# Patient Record
Sex: FEMALE | Race: WHITE | NOT HISPANIC OR LATINO | Employment: OTHER | URBAN - METROPOLITAN AREA
[De-identification: names, ages, dates, MRNs, and addresses within clinical notes are randomized per-mention and may not be internally consistent; named-entity substitution may affect disease eponyms.]

---

## 2017-01-23 ENCOUNTER — OFFICE VISIT (OUTPATIENT)
Dept: GYNECOLOGIC ONCOLOGY | Facility: CLINIC | Age: 47
End: 2017-01-23

## 2017-01-23 VITALS
RESPIRATION RATE: 16 BRPM | WEIGHT: 182 LBS | TEMPERATURE: 97.3 F | OXYGEN SATURATION: 96 % | SYSTOLIC BLOOD PRESSURE: 116 MMHG | BODY MASS INDEX: 28.08 KG/M2 | DIASTOLIC BLOOD PRESSURE: 63 MMHG | HEART RATE: 90 BPM

## 2017-01-23 DIAGNOSIS — Z01.419 WELL WOMAN EXAM WITH ROUTINE GYNECOLOGICAL EXAM: ICD-10-CM

## 2017-01-23 DIAGNOSIS — C53.8 CANCER OF OVERLAPPING SITES OF CERVIX UTERI (HCC): Primary | ICD-10-CM

## 2017-01-23 DIAGNOSIS — R91.1 LUNG NODULE, SOLITARY: ICD-10-CM

## 2017-01-23 PROCEDURE — 99396 PREV VISIT EST AGE 40-64: CPT | Performed by: NURSE PRACTITIONER

## 2017-01-23 RX ORDER — TRIAMCINOLONE ACETONIDE 55 UG/1
2 SPRAY, METERED NASAL 2 TIMES DAILY
COMMUNITY
End: 2021-06-22

## 2017-01-23 NOTE — PROGRESS NOTES
GYN ONCOLOGY ANNUAL WELL WOMAN VISIT      Jackelyn Galindo  [id number]  1970      Chief Complaint: Follow-up (3 mo with no complaints)        History of present illness:  Jackelyn Galindo is a 46 y.o. year old female who is here today for an annual exam and ongoing surveillance of Cervical Cancer, see cancer history below. She reports she is feeling very well today and has no complaints. She denies pelvic pain, changes in bowel or bladder function, new or concerning lesions, and breast problems. She continues to have some radiation related post-coital vaginal spotting, but denies any spontaneous vaginal bleeding. Her repeat chest CT on 11/9/2016 showed a stable singular lung nodule and plan is to repeat study in 6 months.  She is now 2 years out from completion of primary treatment and very pleased with her outcomes. She underwent tonsillectomy in 11/2016 and is well recovered. Her mammogram is currently UTD and she plans to repeat colonoscopy at age 48. She has a trip to [facility] planned for early 4/2017 which she is really looking forward to.        Cancer History:      Cancer of overlapping sites of cervix uteri    11/3/2014 Biopsy    Cervical biopsy and EMB for pap smear showing high-grade lesion with 6 months of AUB including post-coital bleeding. Endometrium was benign, however, cervical biopsy showed marked atypia consistent with at least CIS and suspicious for invasion.       11/6/2014 -  Other Event    6 cm verrucoid lesion identified upon exam and consistent with invasive cancer. Tumor replaced most of the cervix and did not involve the vagina.       11/10/2014 Imaging    PET scan consistent with primary malignancy at the cervix. Also reveals fluid in the endometrial cavity and low level activity at the right adnexa      11/19/2014 - 12/22/2014 Chemotherapy    Cisplatin 40 mg/m2 weekly x 6 cycles +RT      11/20/2014 - 12/31/2014 Radiation    Pelvis received 45 Gy in 25 fractions with 6 MV photons  "utilizing IMRT treatment planning.      12/15/2014 Surgery    Placement of cervical sleeve with tandem and ovoid placement per Dr. Ramsay      12/15/2014 - 2015 Radiation    Brachytherapy implants x 5 with 5.5 Gy delivered to point \"A\" on each implant        2016 Imaging    Repeat PET shows resolution of activity at the right adnexa and cervical regions. New activity identified within a posterior nodule in the right upper lung, concerning for malignant focus, but needing clinical correlation      3/11/2016 -  Other Event    Navigational brochoscopy for evaluation of RUL lesion, all cytology negative      2016 Imaging    Repeat chest CT shows stable 1 cm soft tissue nodule         Obstetric History:  OB History      Para Term  AB TAB SAB Ectopic Multiple Living    3 3 3                Menstrual History:     No LMP recorded. Patient is not currently having periods (Reason: Chemotherapy/radiation).          Past Medical History   Diagnosis Date   • Cervical cancer    • Drug therapy 10/2014     FOR CERVICAL CA   • Hx of radiation therapy 10/2014     FOR CERVICAL CA   • Lung nodule, solitary      1 cm in RUL, negative cytology upon navigational brochoscopy   • Malignant tumor of cervix        Past Surgical History   Procedure Laterality Date   • Tandem and ovoid insertion       with cervical sleeve, sleeve then removed    • Colonoscopy     • Tonsillectomy  2016       MEDICATIONS: The current medication list was reviewed and reconciled.     Allergies:  has No Known Allergies.    Family History   Problem Relation Age of Onset   • Colon cancer Mother    • Ovarian cancer Mother 35   • Breast cancer Maternal Aunt    • Coronary artery disease Father    • Breast cancer Other    • Breast cancer Paternal Aunt 50       Health Maintenance:  Last mammogram was 2016. Last colonoscopy was , with recommended follow-up at age 48. Last pap smear was 10/24/2016, results were  normal PAP..    Last " imaging study was chest CT 11/2016, stable.     Review of Systems   Constitutional: Negative for fatigue, fever and unexpected weight change.   HENT: Negative for congestion, ear pain, hearing loss, sinus pressure and trouble swallowing.    Eyes: Negative for visual disturbance.   Respiratory: Negative for cough, chest tightness, shortness of breath and wheezing.    Cardiovascular: Negative for chest pain, palpitations and leg swelling.   Gastrointestinal: Negative for abdominal distention, abdominal pain, constipation, diarrhea, nausea and vomiting.   Endocrine: Negative for cold intolerance, heat intolerance, polydipsia, polyphagia and polyuria.   Genitourinary: Negative for difficulty urinating, dyspareunia, dysuria, frequency, hematuria, pelvic pain, urgency, vaginal bleeding, vaginal discharge and vaginal pain.   Musculoskeletal: Negative for arthralgias, gait problem, joint swelling and myalgias.   Skin: Negative for color change, pallor and rash.   Neurological: Negative for dizziness, seizures, syncope, weakness, light-headedness, numbness and headaches.   Hematological: Negative for adenopathy. Does not bruise/bleed easily.   Psychiatric/Behavioral: Negative for agitation, confusion, sleep disturbance and suicidal ideas. The patient is not nervous/anxious.        Physical Exam  General Appearance:  alert, cooperative, no apparent distress, appears stated age and normal weight   Neurologic/Psychiatric: A&O x 3, gait steady, appropriate affect   HEENT:  Normocephalic, without obvious abnormality, mucous membranes moist   Neck: Supple, symmetrical, trachea midline, no adenopathy;  No thyromegaly, masses, or tenderness   Back:   Symmetric, no curvature, ROM normal, no CVA tenderness   Lungs:   Clear to auscultation bilaterally; respirations regular, even, and unlabored bilaterally   Heart:  Regular rate and rhythm, no murmurs appreciated   Breasts:  Symmetrical, no masses, no lesions and no nipple discharge    Abdomen:   Soft, non-tender, non-distended and no organomegaly   Lymph nodes: No cervical, supraclavicular, inguinal or axillary adenopathy noted   Extremities: Normal, atraumatic; no clubbing, cyanosis, or edema    Skin: No rashes, ulcers, or suspicious lesions noted   Pelvic: External Genitalia  without lesions or skin changes  Vagina  is pink, moist, without lesions.  and changes consistent with radiation  Cervix  normal, without lesions, no discharge, no CMT and decreased cervical mobility and stenosis consistent with prior radiation. Pap smear obtained and vaginal cuff bleeding upon cytobrush  Uterus  normal size, midposition, mobile and nontender  Ovaries  without palpable masses or fullness  Parametria  smooth  Rectovaginal  Female rectovaginal: deferred     ECOG Performance Status: 0 - Asymptomatic    Procedure Note:  No notes on file    Assessment and Plan:    Jackelyn was seen today for follow-up.    Diagnoses and all orders for this visit:    Cancer of overlapping sites of cervix uteri  -     Liquid-based Pap Smear, Screening; Future    Well woman exam with routine gynecological exam    Lung nodule, solitary        There is no evidence of disease upon today's exam. She may call in 1 week for pap smear results. I am pleased to see that her recent chest CT was stable. She is doing very well from a GYN perspective and may now go to cancer surveillance visits every 6 months. Every other visit will be a complete annual exam going forward as she requests to keep all her GYN care through our office. She is encouraged to call with any questions, changes in pelvic symptoms, or concerns if needed between visits.     Return in about 6 months (around 7/23/2017) for ongoing cancer surveillance.      TEETEE Fan      Note: Speech recognition transcription software was used to dictate portions of this document.  An attempt at proofreading has been made though minor errors in transcription may still be  present.  Please do not hesitate to call our office with any questions.

## 2017-01-23 NOTE — PROGRESS NOTES
"GYN ONCOLOGY CANCER SURVEILLANCE FOLLOW-UP    Jackelyn Galindo  [id number]  1970    Chief Complaint: Follow-up (3 mo with no complaints)        History of present illness:  Jackelyn Galindo is a 46 y.o. year old female who is here today for ongoing surveillance of Cervical Cancer, see Cancer History. ***        Cancer History:      Cancer of overlapping sites of cervix uteri    11/3/2014 Biopsy    Cervical biopsy and EMB for pap smear showing high-grade lesion with 6 months of AUB including post-coital bleeding. Endometrium was benign, however, cervical biopsy showed marked atypia consistent with at least CIS and suspicious for invasion.       11/6/2014 -  Other Event    6 cm verrucoid lesion identified upon exam and consistent with invasive cancer. Tumor replaced most of the cervix and did not involve the vagina.       11/10/2014 Imaging    PET scan consistent with primary malignancy at the cervix. Also reveals fluid in the endometrial cavity and low level activity at the right adnexa      11/19/2014 - 12/22/2014 Chemotherapy    Cisplatin 40 mg/m2 weekly x 6 cycles +RT      11/20/2014 - 12/31/2014 Radiation    Pelvis received 45 Gy in 25 fractions with 6 MV photons utilizing IMRT treatment planning.      12/15/2014 Surgery    Placement of cervical sleeve with tandem and ovoid placement per Dr. Ramsay      12/15/2014 - 1/8/2015 Radiation    Brachytherapy implants x 5 with 5.5 Gy delivered to point \"A\" on each implant        2/16/2016 Imaging    Repeat PET shows resolution of activity at the right adnexa and cervical regions. New activity identified within a posterior nodule in the right upper lung, concerning for malignant focus, but needing clinical correlation      3/11/2016 -  Other Event    Navigational brochoscopy for evaluation of RUL lesion, all cytology negative      5/13/2016 Imaging    Repeat chest CT shows stable 1 cm soft tissue nodule         Past Medical History   Diagnosis Date   • Cervical cancer  " "  • Drug therapy 10/2014     FOR CERVICAL CA   • Hx of radiation therapy 10/2014     FOR CERVICAL CA   • Lung nodule, solitary      1 cm in RUL, negative cytology upon navigational brochoscopy   • Malignant tumor of cervix        Past Surgical History   Procedure Laterality Date   • Tandem and ovoid insertion       with cervical sleeve, sleeve then removed    • Colonoscopy     • Tonsillectomy  11/2016       MEDICATIONS: The current medication list was reviewed and reconciled.     Allergies:  has No Known Allergies.    Family History   Problem Relation Age of Onset   • Colon cancer Mother    • Ovarian cancer Mother 35   • Breast cancer Maternal Aunt    • Coronary artery disease Father    • Breast cancer Other    • Breast cancer Paternal Aunt 50       Last imaging study was ***.   Tumor marker:  No results found for:     Review of Systems    Physical Exam  General Appearance:  {[facility] General Appearance:83774::\"alert, cooperative, no apparent distress\",\"appears stated age\"}   Neurologic/Psychiatric: A&O x 3, gait steady, appropriate affect   HEENT:  Normocephalic, without obvious abnormality, mucous membranes moist   Neck: Supple, symmetrical, trachea midline, no adenopathy;  No thyromegaly, masses, or tenderness   Back:   Symmetric, no curvature, ROM normal, no CVA tenderness   Lungs:   Clear to auscultation bilaterally; respirations regular, even, and unlabored bilaterally   Heart:  Regular rate and rhythm, no murmurs appreciated   Breasts:  {Gyn Onc Breast Exam:08915::\"deferred\"}   Abdomen:   {[facility] Abdomen Exam:96860::\"Soft\",\"non-tender\",\"non-distended\",\"no organomegaly\"}   Lymph nodes: No cervical, supraclavicular, inguinal or axillary adenopathy noted   Extremities: Normal, atraumatic; no clubbing, cyanosis, or edema    Pelvic: {GynOnc Pelvic Exam:16085}     ECOG Performance Status: {performance status:50766}    Procedure Note:  No notes on file      Assessment and Plan:  Jackelyn was seen today for " follow-up.    Diagnoses and all orders for this visit:    Cancer of overlapping sites of cervix uteri        ***    No Follow-up on file.      TEETEE Fan        Note: Speech recognition transcription software was used to dictate portions of this document.  An attempt at proofreading has been made though minor errors in transcription may still be present.  Please do not hesitate to call our office with any questions.

## 2017-01-23 NOTE — MR AVS SNAPSHOT
Jackelyn Galindo   1/23/2017 10:30 AM   Office Visit    Dept Phone:  [phone]   Encounter #:  [id number]    Provider:  TEETEE Fan   Department:  [facility] GYNECOLOGIC ONCOLOGY                Your Full Care Plan              Your Updated Medication List          This list is accurate as of: 1/23/17 11:27 AM.  Always use your most recent med list.                Triamcinolone Acetonide 55 MCG/ACT nasal inhaler   Commonly known as:  NASACORT               You Were Diagnosed With        Codes Comments    Cancer of overlapping sites of cervix uteri    -  Primary ICD-10-CM: C53.8  ICD-9-CM: 180.8     Well woman exam with routine gynecological exam     ICD-10-CM: Z01.419  ICD-9-CM: V72.31       Instructions     None    Patient Instructions History      Upcoming Appointments     Visit Type Date Time Department    FOLLOW UP 1/23/2017 10:30 AM [facility] ONC GYN [facility]    CT [facility] CHEST WO CONTRAST 5/10/2017 10:30 AM [facility] [place]    FOLLOW UP 7/24/2017 10:00 AM [facility] GYN [facility]    PHYSICAL 10/27/2017 10:00 AM [facility]      Zoodak Signup     Our records indicate that you have an active [facility] [employer] account.    You can view your After Visit Summary by going to [email or web address] and logging in with your Zoodak username and password.  If you don't have a [employer] username and password but a parent or guardian has access to your record, the parent or guardian should login with their own [employer]ak username and password and access your record to view the After Visit Summary.    If you have questions, you can email [email or web address] or call [phone] to talk to our [employer] staff.  Remember, Z[employer]ak is NOT to be used for urgent needs.  For medical emergencies, dial 911.               Other Info from Your Visit           Your Appointments     May 10, 2017 10:30 AM EDT   CT marybeth chest wo contrast with MARYBETH ANGELICA CT 1   [facility]  [address] [place] [address]    [address] [facility] [address] [phone]           n/a            Jul 24, 2017 10:00 AM EDT   Follow Up with TEETEE Fan   [facility] GYNECOLOGIC ONCOLOGY (--)    [address]  [facility] [address] [phone]           Arrive 15 minutes prior to appointment.            Oct 27, 2017 10:00 AM EDT   Physical with Patti Rodriguez DO   [facility] FAMILY MEDICINE (--)    [address]  [facility] [address] [phone]           Arrive 15 minutes prior to appointment. Arrive 15 minutes before your appointment to complete Registration. Please bring all medications, insurance card and copay.              Allergies     No Known Allergies      Reason for Visit     Follow-up 3 mo with no complaints      Vital Signs     Blood Pressure Pulse Temperature Respirations Weight Oxygen Saturation    116/63 90 97.3 °F (36.3 °C) (Temporal Artery ) 16 182 lb (82.6 kg) 96%    Body Mass Index Smoking Status                28.08 kg/m2 Never Smoker          Problems and Diagnoses Noted     Cancer of overlapping sites of cervix uteri    Well woman exam with routine gynecological exam

## 2017-05-10 ENCOUNTER — HOSPITAL ENCOUNTER (OUTPATIENT)
Dept: CT IMAGING | Facility: HOSPITAL | Age: 47
Discharge: HOME | End: 2017-05-10
Attending: INTERNAL MEDICINE

## 2017-05-10 ENCOUNTER — HOSPITAL ENCOUNTER (OUTPATIENT)
Dept: CT IMAGING | Facility: HOSPITAL | Age: 47
Discharge: HOME | End: 2017-05-10
Attending: INTERNAL MEDICINE | Admitting: INTERNAL MEDICINE

## 2017-05-10 DIAGNOSIS — R91.1 SOLITARY PULMONARY NODULE: ICD-10-CM

## 2017-05-10 PROCEDURE — 71250 CT THORAX DX C-: CPT

## 2017-07-24 ENCOUNTER — OFFICE VISIT (OUTPATIENT)
Dept: GYNECOLOGIC ONCOLOGY | Facility: CLINIC | Age: 47
End: 2017-07-24

## 2017-07-24 VITALS
OXYGEN SATURATION: 96 % | RESPIRATION RATE: 16 BRPM | DIASTOLIC BLOOD PRESSURE: 82 MMHG | TEMPERATURE: 97.5 F | HEART RATE: 85 BPM | WEIGHT: 179 LBS | BODY MASS INDEX: 27.62 KG/M2 | SYSTOLIC BLOOD PRESSURE: 135 MMHG

## 2017-07-24 DIAGNOSIS — R91.1 LUNG NODULE, SOLITARY: ICD-10-CM

## 2017-07-24 DIAGNOSIS — C53.8 CANCER OF OVERLAPPING SITES OF CERVIX UTERI (HCC): Primary | ICD-10-CM

## 2017-07-24 PROCEDURE — 99213 OFFICE O/P EST LOW 20 MIN: CPT | Performed by: NURSE PRACTITIONER

## 2017-07-24 NOTE — PROGRESS NOTES
GYN ONCOLOGY CANCER SURVEILLANCE FOLLOW-UP    Jackelyn Galindo  [id number]  1970    Chief Complaint: Follow-up (6 mo fup no problems) and Cervical Cancer (interested in ACP)        History of present illness:  Jackelyn Galindo is a 47 y.o. year old female who is here today for ongoing surveillance of Cervical Cancer, see Cancer History. She underwent repeat CT chest to follow solitary lung nodule on 5/10/2017. Nodule found to be stable with no acute chest pathology. She reports she is feeling very well today and has no complaints. She denies vaginal bleeding, pelvic pain, and changes in bowel or bladder function. She continues to have mild expected spotting post-coitus r/t expected radiation change.   Her son will be starting college at the [employer] this fall. They have decided to sell their home here, have purchased a smaller place, and will be moving to [place] for most of the year. They will continue to travel back and forth d/t her 's business here in [place]. They are very excited for this transition as they live a very active outdoor lifestyle. She states she will continue to be in [place] at least once/month and would like to continue her care through our office.         Cancer History:      Cancer of overlapping sites of cervix uteri    11/3/2014 Biopsy     Cervical biopsy and EMB for pap smear showing high-grade lesion with 6 months of AUB including post-coital bleeding. Endometrium was benign, however, cervical biopsy showed marked atypia consistent with at least CIS and suspicious for invasion.          11/6/2014 -  Other Event     6 cm verrucoid lesion identified upon exam and consistent with invasive cancer. Tumor replaced most of the cervix and did not involve the vagina.          11/10/2014 Imaging     PET scan consistent with primary malignancy at the cervix. Also reveals fluid in the endometrial cavity and low level activity at the right adnexa         11/19/2014 - 12/22/2014 Chemotherapy  "    Cisplatin 40 mg/m2 weekly x 6 cycles +RT         11/20/2014 - 12/31/2014 Radiation     Pelvis received 45 Gy in 25 fractions with 6 MV photons utilizing IMRT treatment planning.         12/15/2014 Surgery     Placement of cervical sleeve with tandem and ovoid placement per Dr. Ramsay         12/15/2014 - 1/8/2015 Radiation     Brachytherapy implants x 5 with 5.5 Gy delivered to point \"A\" on each implant           2/16/2016 Imaging     Repeat PET shows resolution of activity at the right adnexa and cervical regions. New activity identified within a posterior nodule in the right upper lung, concerning for malignant focus, but needing clinical correlation         3/11/2016 -  Other Event     Navigational brochoscopy for evaluation of RUL lesion, all cytology negative         5/13/2016 Imaging     Repeat chest CT shows stable 1 cm soft tissue nodule            Past Medical History:   Diagnosis Date   • Cervical cancer    • Drug therapy 10/2014    FOR CERVICAL CA   • Hx of radiation therapy 10/2014    FOR CERVICAL CA   • Lung nodule, solitary     1 cm in RUL, negative cytology upon navigational brochoscopy   • Malignant tumor of cervix        Past Surgical History:   Procedure Laterality Date   • COLONOSCOPY     • TANDEM AND OVOID INSERTION      with cervical sleeve, sleeve then removed    • TONSILLECTOMY  11/2016       MEDICATIONS: The current medication list was reviewed and reconciled.     Allergies:  has No Known Allergies.    Family History   Problem Relation Age of Onset   • Colon cancer Mother    • Ovarian cancer Mother 35   • Breast cancer Maternal Aunt    • Coronary artery disease Father    • Breast cancer Other    • Breast cancer Paternal Aunt 50       Review of Systems   Constitutional: Negative for appetite change, chills, fatigue, fever and unexpected weight change.   Respiratory: Negative for cough, shortness of breath and wheezing.    Cardiovascular: Negative for chest pain, palpitations and leg " swelling.   Gastrointestinal: Negative for abdominal distention, abdominal pain, blood in stool, constipation, diarrhea, nausea and vomiting.   Endocrine: Negative.    Genitourinary: Negative for dyspareunia, dysuria, frequency, genital sores, hematuria, pelvic pain, urgency, vaginal bleeding, vaginal discharge and vaginal pain.   Musculoskeletal: Negative for arthralgias, gait problem and joint swelling.   Neurological: Negative for dizziness, seizures, syncope, weakness, light-headedness, numbness and headaches.   Hematological: Negative for adenopathy.   Psychiatric/Behavioral: Negative.        Physical Exam  Vital Signs: /82  Pulse 85  Temp 97.5 °F (36.4 °C) (Temporal Artery )   Resp 16  Wt 179 lb (81.2 kg)  SpO2 96%  BMI 27.62 kg/m2   General Appearance:  alert, cooperative, no apparent distress, appears stated age and normal weight   Neurologic/Psychiatric: A&O x 3, gait steady, appropriate affect   HEENT:  Normocephalic, without obvious abnormality, mucous membranes moist   Neck: Supple, symmetrical, trachea midline, no adenopathy;  No thyromegaly, masses, or tenderness   Back:   Symmetric, no curvature, ROM normal, no CVA tenderness   Lungs:   Clear to auscultation bilaterally; respirations regular, even, and unlabored bilaterally   Heart:  Regular rate and rhythm, no murmurs appreciated   Breasts:  deferred   Abdomen:   Soft, non-tender, non-distended and no organomegaly   Lymph nodes: No cervical, supraclavicular, inguinal or axillary adenopathy noted   Extremities: Normal, atraumatic; no clubbing, cyanosis, or edema    Pelvic: External Genitalia  without lesions or skin changes  Vagina  is pink, moist, without lesions.  and changes consistent with previous radiation.  Cervix  without lesions, no discharge and no CMT. Decreased cervical mobility and stenosis consistent with prior radiation. Pap smear obtained.   Uterus  normal size, midposition, mobile and nontender  Ovaries  without palpable  "masses or fullness  Parametria  smooth  Rectovaginal  Female rectovaginal: deferred     ECOG Performance Status: 0 - Asymptomatic    Procedure Note:  No notes on file      Assessment and Plan:  Jackelyn was seen today for follow-up and cervical cancer.    Diagnoses and all orders for this visit:    Cancer of overlapping sites of cervix uteri  -     Pap IG, Rfx HPV ASCU; Future    Lung nodule, solitary      There is no evidence of disease upon today's exam. She is understanding to call with any changes in pelvic symptoms or general GYN concerns.     Recent repeat CT stable.    The patient and I discussed the  program for Advanced Care Planning, \"Conversations that Matter\".  She was offered this service, free of charge, for development of advance directives with a certified ACP facilitator. She is understanding that this will allow her to assign a healthcare surrogate and make her wishes known regarding her own future medical care. She is very interested in this and will be scheduling an appointment at check out today.         Return in about 6 months (around 1/24/2018) for annual exam & ongoing cancer surveillance, or PRN.      TEETEE Fan        Note: Speech recognition transcription software was used to dictate portions of this document.  An attempt at proofreading has been made though minor errors in transcription may still be present.  Please do not hesitate to call our office with any questions.  "

## 2018-07-30 ENCOUNTER — OFFICE VISIT (OUTPATIENT)
Dept: GYNECOLOGIC ONCOLOGY | Facility: CLINIC | Age: 48
End: 2018-07-30

## 2018-07-30 VITALS
TEMPERATURE: 98 F | RESPIRATION RATE: 16 BRPM | OXYGEN SATURATION: 96 % | HEART RATE: 91 BPM | BODY MASS INDEX: 26.37 KG/M2 | DIASTOLIC BLOOD PRESSURE: 83 MMHG | HEIGHT: 68 IN | SYSTOLIC BLOOD PRESSURE: 128 MMHG | WEIGHT: 174 LBS

## 2018-07-30 DIAGNOSIS — Z01.419 WELL WOMAN EXAM WITH ROUTINE GYNECOLOGICAL EXAM: Primary | ICD-10-CM

## 2018-07-30 DIAGNOSIS — Z12.11 SCREENING FOR COLON CANCER: ICD-10-CM

## 2018-07-30 DIAGNOSIS — C53.8 CANCER OF OVERLAPPING SITES OF CERVIX UTERI (HCC): ICD-10-CM

## 2018-07-30 DIAGNOSIS — Z12.31 ENCOUNTER FOR SCREENING MAMMOGRAM FOR BREAST CANCER: ICD-10-CM

## 2018-07-30 PROCEDURE — 99396 PREV VISIT EST AGE 40-64: CPT | Performed by: NURSE PRACTITIONER

## 2018-07-30 NOTE — PROGRESS NOTES
GYN ONCOLOGY ANNUAL WELL WOMAN VISIT      Jackelyn Galindo  [id number]  1970      Chief Complaint: Annual Exam (no gyn complaints )        History of present illness:  Jackelyn Galindo is a 48 y.o. year old female who is here today for an annual exam and surveillance of cervical cancer, see oncology history. She reports she is feeling very well today and has no complaints. She denies vaginal bleeding, pelvic pain, changes in bowel or bladder function, new or concerning lesions, and breast problems. She is currently due for a mammogram and a colonoscopy. She requests external orders for these studies to be done in [place].   Since her last visit here, they have moved to [place] full time. 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 They are keeping a small home here and travel back and forth frequently. Her son had a great first year at [employer]. She daughter started school there as well, but unfortunately was in school where the devastating school shooting happened this past year. She lost several friends and is working to cope with the trauma. They have been coming together as a family to support her and continue to travel frequently.     Cancer History:      Cancer of overlapping sites of cervix uteri (CMS/HCC)    11/3/2014 Biopsy     Cervical biopsy and EMB for pap smear showing high-grade lesion with 6 months of AUB including post-coital bleeding. Endometrium was benign, however, cervical biopsy showed marked atypia consistent with at least CIS and suspicious for invasion.          11/6/2014 -  Other Event     6 cm verrucoid lesion identified upon exam and consistent with invasive cancer. Tumor replaced most of the cervix and did not involve the vagina.          11/10/2014 Imaging     PET scan consistent with primary malignancy at the cervix. Also reveals fluid in the endometrial cavity and low level activity at the right adnexa          "2014 - 2014 Chemotherapy     Cisplatin 40 mg/m2 weekly x 6 cycles +RT         2014 - 2014 Radiation     Pelvis received 45 Gy in 25 fractions with 6 MV photons utilizing IMRT treatment planning.         12/15/2014 Surgery     Placement of cervical sleeve with tandem and ovoid placement per Dr. Ramsay         12/15/2014 - 2015 Radiation     Brachytherapy implants x 5 with 5.5 Gy delivered to point \"A\" on each implant           2016 Imaging     Repeat PET shows resolution of activity at the right adnexa and cervical regions. New activity identified within a posterior nodule in the right upper lung, concerning for malignant focus, but needing clinical correlation         3/11/2016 -  Other Event     Navigational brochoscopy for evaluation of RUL lesion, all cytology negative         2016 Imaging     Repeat chest CT shows stable 1 cm soft tissue nodule            Obstetric History:  OB History      Para Term  AB Living    3 3 3          SAB TAB Ectopic Molar Multiple Live Births                        Menstrual History:     No LMP recorded. Patient is not currently having periods (Reason: Chemotherapy/radiation).          Past Medical History:   Diagnosis Date   • Cervical cancer (CMS/HCC)    • Drug therapy 10/2014    FOR CERVICAL CA   • Hx of radiation therapy 10/2014    FOR CERVICAL CA   • Lung nodule, solitary     1 cm in RUL, negative cytology upon navigational brochoscopy   • Malignant tumor of cervix (CMS/HCC)        Past Surgical History:   Procedure Laterality Date   • COLONOSCOPY     • TANDEM AND OVOID INSERTION      with cervical sleeve, sleeve then removed    • TONSILLECTOMY  2016       MEDICATIONS: The current medication list was reviewed and reconciled.     Allergies:  has No Known Allergies.    Family History   Problem Relation Age of Onset   • Colon cancer Mother    • Ovarian cancer Mother 35   • Breast cancer Maternal Aunt    • Coronary artery disease " "Father    • Breast cancer Other    • Breast cancer Paternal Aunt 50       Health Maintenance:  Last mammogram was 11/2/2016. Last pap smear was 7/24/2017, results were  normal PAP..    Review of Systems   Constitutional: Negative for fatigue, fever and unexpected weight change.   HENT: Negative for congestion, ear pain, hearing loss, sinus pressure and trouble swallowing.    Eyes: Negative for visual disturbance.   Respiratory: Negative for cough, chest tightness, shortness of breath and wheezing.    Cardiovascular: Negative for chest pain, palpitations and leg swelling.   Gastrointestinal: Negative for abdominal distention, abdominal pain, constipation, diarrhea, nausea and vomiting.   Endocrine: Negative for cold intolerance, heat intolerance, polydipsia, polyphagia and polyuria.   Genitourinary: Negative for difficulty urinating, dyspareunia, dysuria, frequency, hematuria, pelvic pain, urgency, vaginal bleeding, vaginal discharge and vaginal pain.   Musculoskeletal: Negative for arthralgias, gait problem, joint swelling and myalgias.   Skin: Negative for color change, pallor and rash.   Neurological: Negative for dizziness, seizures, syncope, weakness, light-headedness, numbness and headaches.   Hematological: Negative for adenopathy. Does not bruise/bleed easily.   Psychiatric/Behavioral: Negative for agitation, confusion, sleep disturbance and suicidal ideas. The patient is not nervous/anxious.        Physical Exam  Vital Signs: /83   Pulse 91   Temp 98 °F (36.7 °C) (Temporal Artery )   Resp 16   Ht 171.5 cm (67.5\")   Wt 78.9 kg (174 lb)   SpO2 96%   BMI 26.85 kg/m²    General Appearance:  alert, cooperative, no apparent distress and appears stated age   Neurologic/Psychiatric: A&O x 3, gait steady, appropriate affect   HEENT:  Normocephalic, without obvious abnormality, mucous membranes moist   Neck: Supple, symmetrical, trachea midline, no adenopathy;  No thyromegaly, masses, or tenderness "   Back:   Symmetric, no curvature, ROM normal, no CVA tenderness   Lungs:   Clear to auscultation bilaterally; respirations regular, even, and unlabored bilaterally   Heart:  Regular rate and rhythm, no murmurs appreciated   Breasts:  Symmetrical, no masses, no lesions and no nipple discharge   Abdomen:   Soft, non-tender, non-distended and no organomegaly   Lymph nodes: No cervical, supraclavicular, inguinal or axillary adenopathy noted   Extremities: Normal, atraumatic; no clubbing, cyanosis, or edema    Skin: No rashes, ulcers, or suspicious lesions noted   Pelvic: External Genitalia  without lesions or skin changes  Vagina  pink, moist, without lesions. Mild radiation change noted  Cervix  without lesions or discharge. No CMT. Decreased cervical mobility and stenosis consistent with prior radiation. Pap smear obtained.   Uterus  normal size, midposition, mobile and nontender  Ovaries  without palpable masses or fullness  Parametria  smooth  Rectovaginal  Female rectovaginal: deferred     ECOG Performance Status: 0 - Asymptomatic    Procedure Note:  No notes on file    Assessment and Plan:    Jackelyn was seen today for annual exam.    Diagnoses and all orders for this visit:    Well woman exam with routine gynecological exam    Cancer of overlapping sites of cervix uteri (CMS/HCC)  -     Pap IG, Rfx HPV ASCU; Future    Screening for colon cancer  -     Ambulatory Referral For Screening Colonoscopy    Encounter for screening mammogram for breast cancer  -     Mammo Screening Digital Tomosynthesis Bilateral With CAD; Future        Pap was done today.  Call in 1 week for pap smear results.     There is no evidence of disease upon today's exam. She is understanding to call with any changes in pelvic symptoms or general GYN concerns at any time between regularly scheduled visits. She would like to continue routine cancer surveillance with our office and states she travels back here frequently despite their move to  [place].     External mammogram and colonoscopy orders given at checkout today.     She was recommended to begin colonoscopy at age 48 for colon cancer screening and bone density scans (DEXA) at age 60-65 for osteoporosis screening.    Return to clinic in 1 year for Annual exam or PRN and ongoing cancer surveillance.      Alee Rico, TEETEE      Note: Speech recognition transcription software was used to dictate portions of this document.  An attempt at proofreading has been made though minor errors in transcription may still be present.  Please do not hesitate to call our office with any questions.

## 2019-02-15 ENCOUNTER — OFFICE VISIT (OUTPATIENT)
Dept: GYNECOLOGIC ONCOLOGY | Facility: CLINIC | Age: 49
End: 2019-02-15

## 2019-02-15 VITALS
HEART RATE: 80 BPM | RESPIRATION RATE: 12 BRPM | TEMPERATURE: 97.5 F | OXYGEN SATURATION: 96 % | WEIGHT: 168 LBS | SYSTOLIC BLOOD PRESSURE: 125 MMHG | DIASTOLIC BLOOD PRESSURE: 76 MMHG | BODY MASS INDEX: 25.92 KG/M2

## 2019-02-15 DIAGNOSIS — C53.8 CANCER OF OVERLAPPING SITES OF CERVIX UTERI (HCC): Primary | ICD-10-CM

## 2019-02-15 PROCEDURE — 99396 PREV VISIT EST AGE 40-64: CPT | Performed by: NURSE PRACTITIONER

## 2019-02-15 NOTE — PROGRESS NOTES
GYN ONCOLOGY CANCER SURVEILLANCE FOLLOW-UP    Jackelyn Galindo  [id number]  1970    Chief Complaint: Follow-up (no gyn complaints)        History of present illness:  Jackelyn Galindo is a 48 y.o. year old female who is here today for ongoing surveillance of Cervical Cancer, see Cancer History. She reports she is feeling very well today and has no complaints. She denies vaginal bleeding, pelvic pain, and changes in bowel or bladder function. She is now 4 years out from completion of treatment. She desires to continue pap smears every 6 months for now.   Her family is enjoying life in [place] and they are keeping a small home here, traveling back and forth frequently. Her children are doing well and she is happy to report her youngest daughter has been accepted into an accelerated high school program where she will be completing college courses, on track for a very early bachelor's degree.        Cancer History:      Cancer of overlapping sites of cervix uteri (CMS/HCC)    11/3/2014 Biopsy     Cervical biopsy and EMB for pap smear showing high-grade lesion with 6 months of AUB including post-coital bleeding. Endometrium was benign, however, cervical biopsy showed marked atypia consistent with at least CIS and suspicious for invasion.          11/6/2014 -  Other Event     6 cm verrucoid lesion identified upon exam and consistent with invasive cancer. Tumor replaced most of the cervix and did not involve the vagina.          11/10/2014 Imaging     PET scan consistent with primary malignancy at the cervix. Also reveals fluid in the endometrial cavity and low level activity at the right adnexa         11/19/2014 - 12/22/2014 Chemotherapy     Cisplatin 40 mg/m2 weekly x 6 cycles +RT         11/20/2014 - 12/31/2014 Radiation     Pelvis received 45 Gy in 25 fractions with 6 MV photons utilizing IMRT treatment planning.         12/15/2014 Surgery     Placement of cervical sleeve with tandem and ovoid placement per   "[facility]         12/15/2014 - 1/8/2015 Radiation     Brachytherapy implants x 5 with 5.5 Gy delivered to point \"A\" on each implant           2/16/2016 Imaging     Repeat PET shows resolution of activity at the right adnexa and cervical regions. New activity identified within a posterior nodule in the right upper lung, concerning for malignant focus, but needing clinical correlation         3/11/2016 -  Other Event     Navigational brochoscopy for evaluation of RUL lesion, all cytology negative         5/13/2016 Imaging     Repeat chest CT shows stable 1 cm soft tissue nodule            Past Medical History:   Diagnosis Date   • Cervical cancer (CMS/HCC)    • Drug therapy 10/2014    FOR CERVICAL CA   • Hx of radiation therapy 10/2014    FOR CERVICAL CA   • Lung nodule, solitary     1 cm in RUL, negative cytology upon navigational brochoscopy   • Malignant tumor of cervix (CMS/HCC)        Past Surgical History:   Procedure Laterality Date   • COLONOSCOPY     • TANDEM AND OVOID INSERTION      with cervical sleeve, sleeve then removed    • TONSILLECTOMY  11/2016       MEDICATIONS: The current medication list was reviewed and reconciled.     Allergies:  has No Known Allergies.    Family History   Problem Relation Age of Onset   • Colon cancer Mother    • Ovarian cancer Mother 35   • Breast cancer Maternal Aunt    • Coronary artery disease Father    • Breast cancer Other    • Breast cancer Paternal Aunt 50         Review of Systems   Constitutional: Negative for appetite change, chills, fatigue, fever and unexpected weight change.   Respiratory: Negative for cough, shortness of breath and wheezing.    Cardiovascular: Negative for chest pain, palpitations and leg swelling.   Gastrointestinal: Negative for abdominal distention, abdominal pain, blood in stool, constipation, diarrhea, nausea and vomiting.   Endocrine: Negative.    Genitourinary: Negative for dyspareunia, dysuria, frequency, genital sores, hematuria, pelvic pain, " urgency, vaginal bleeding, vaginal discharge and vaginal pain.   Musculoskeletal: Negative for arthralgias, gait problem and joint swelling.   Neurological: Negative for dizziness, seizures, syncope, weakness, light-headedness, numbness and headaches.   Hematological: Negative for adenopathy.   Psychiatric/Behavioral: Negative.        Physical Exam  Vital Signs: /76   Pulse 80   Temp 97.5 °F (36.4 °C) (Temporal)   Resp 12   Wt 76.2 kg (168 lb)   SpO2 96%   BMI 25.92 kg/m²    General Appearance:  alert, cooperative, no apparent distress, appears stated age and normal weight   Neurologic/Psychiatric: A&O x 3, gait steady, appropriate affect   HEENT:  Normocephalic, without obvious abnormality, mucous membranes moist   Lungs:   Clear to auscultation bilaterally; respirations regular, even, and unlabored bilaterally   Heart:  Regular rate and rhythm, no murmurs appreciated   Breasts:  deferred   Abdomen:   Soft, non-tender, non-distended and no organomegaly   Lymph nodes: No inguinal adenopathy noted   Extremities: Normal, atraumatic; no clubbing, cyanosis, or edema    Pelvic: External Genitalia  without lesions or skin changes  Vagina  is pink, moist, without lesions.  and mild radiation change noted  Cervix  without lesions, no discharge and no CMT. Decreased cervical mobility and stenosis consistent with prior radiation. Pap smear obtained.  Uterus  normal size, midposition, mobile and nontender  Ovaries  without palpable masses or fullness  Parametria  smooth  Rectovaginal  Female rectovaginal: deferred     ECOG Performance Status: 0 - Asymptomatic    Procedure Note:  No notes on file      Assessment and Plan:  Jackelyn was seen today for follow-up.    Diagnoses and all orders for this visit:    Cancer of overlapping sites of cervix uteri (CMS/HCC)        There is no evidence of disease upon today's exam. Continue every 6 month visits until the 5 year rosendo, then may go to yearly surveillance if doing well.  She desires to continue pap smears with each visit. She is understanding to call with any changes in pelvic symptoms or general GYN concerns at any time between regularly scheduled visits.   Pap was done today. If she does not receive the results of the Pap within 2 weeks  time, she was instructed to call to find out the results.        Return to clinic in 6 months for Annual exam and ongoing cancer surveillance.      TEETEE Fan        Note: Speech recognition transcription software was used to dictate portions of this document.  An attempt at proofreading has been made though minor errors in transcription may still be present.  Please do not hesitate to call our office with any questions.

## 2019-07-03 ENCOUNTER — OFFICE VISIT (OUTPATIENT)
Dept: GYNECOLOGIC ONCOLOGY | Facility: CLINIC | Age: 49
End: 2019-07-03

## 2019-07-03 VITALS
RESPIRATION RATE: 16 BRPM | WEIGHT: 154 LBS | BODY MASS INDEX: 23.76 KG/M2 | TEMPERATURE: 98.7 F | HEART RATE: 76 BPM | DIASTOLIC BLOOD PRESSURE: 82 MMHG | OXYGEN SATURATION: 99 % | SYSTOLIC BLOOD PRESSURE: 139 MMHG

## 2019-07-03 DIAGNOSIS — N93.9 VAGINAL BLEEDING: ICD-10-CM

## 2019-07-03 DIAGNOSIS — C53.8 CANCER OF OVERLAPPING SITES OF CERVIX UTERI (HCC): Primary | ICD-10-CM

## 2019-07-03 PROCEDURE — 99214 OFFICE O/P EST MOD 30 MIN: CPT | Performed by: NURSE PRACTITIONER

## 2019-07-03 NOTE — PROGRESS NOTES
"GYN ONCOLOGY CANCER SURVEILLANCE FOLLOW-UP    Jackelyn Galindo  [id number]  1970    Chief Complaint: Follow-up (bleeding with intercourse)        History of present illness:  Jackelyn Galindo is a 49 y.o. year old female who is here today for ongoing surveillance of Cervical Cancer, see Cancer History. She called recently to report bleeding after intercourse and was encouraged to come in for evaluation. Patient reports she has been going through a divorce since her last visit. She and her  had not been sexually active for some time. She has not used a vaginal dilator since treatment as they were initially regularly sexually active. She had occasional light spotting at that time, but there was no pain or heavy bleeding.   Patient was recently sexually active with a new partner after no IC or dilator use for the last 9 months. She notes this was very painful and she experienced very heavy bleeding during the encounter. She has had \"period-like\" bleeding for the 5 days since this, lighter each day. She is very concerned. She is here in [place] now, plans to return to [place] later this week. Most recent pap smear at last surveillance visit negative on 2/15/2019.       Cancer History:      Cancer of overlapping sites of cervix uteri (CMS/HCC)    11/3/2014 Biopsy     Cervical biopsy and EMB for pap smear showing high-grade lesion with 6 months of AUB including post-coital bleeding. Endometrium was benign, however, cervical biopsy showed marked atypia consistent with at least CIS and suspicious for invasion.          11/6/2014 -  Other Event     6 cm verrucoid lesion identified upon exam and consistent with invasive cancer. Tumor replaced most of the cervix and did not involve the vagina.          11/10/2014 Imaging     PET scan consistent with primary malignancy at the cervix. Also reveals fluid in the endometrial cavity and low level activity at the right adnexa         11/19/2014 - 12/22/2014 Chemotherapy     " "Cisplatin 40 mg/m2 weekly x 6 cycles +RT         11/20/2014 - 12/31/2014 Radiation     Pelvis received 45 Gy in 25 fractions with 6 MV photons utilizing IMRT treatment planning.         12/15/2014 Surgery     Placement of cervical sleeve with tandem and ovoid placement per Dr. Ramsay         12/15/2014 - 1/8/2015 Radiation     Brachytherapy implants x 5 with 5.5 Gy delivered to point \"A\" on each implant           2/16/2016 Imaging     Repeat PET shows resolution of activity at the right adnexa and cervical regions. New activity identified within a posterior nodule in the right upper lung, concerning for malignant focus, but needing clinical correlation         3/11/2016 -  Other Event     Navigational brochoscopy for evaluation of RUL lesion, all cytology negative         5/13/2016 Imaging     Repeat chest CT shows stable 1 cm soft tissue nodule            Past Medical History:   Diagnosis Date   • Cervical cancer (CMS/HCC)    • Drug therapy 10/2014    FOR CERVICAL CA   • Hx of radiation therapy 10/2014    FOR CERVICAL CA   • Lung nodule, solitary     1 cm in RUL, negative cytology upon navigational brochoscopy   • Malignant tumor of cervix (CMS/HCC)        Past Surgical History:   Procedure Laterality Date   • COLONOSCOPY     • TANDEM AND OVOID INSERTION      with cervical sleeve, sleeve then removed    • TONSILLECTOMY  11/2016       MEDICATIONS: The current medication list was reviewed and reconciled.     Allergies:  has No Known Allergies.    Family History   Problem Relation Age of Onset   • Colon cancer Mother    • Ovarian cancer Mother 35   • Breast cancer Maternal Aunt    • Coronary artery disease Father    • Breast cancer Other    • Breast cancer Paternal Aunt 50       Last imaging study was PET 2/16/2016, last chest CT 5/10/2017.     Review of Systems   Constitutional: Negative for appetite change, chills, fatigue, fever and unexpected weight change.   Respiratory: Negative for cough, shortness of breath and " wheezing.    Cardiovascular: Negative for chest pain, palpitations and leg swelling.   Gastrointestinal: Negative for abdominal distention, abdominal pain, blood in stool, constipation, diarrhea, nausea and vomiting.   Endocrine: Negative.    Genitourinary: Positive for dyspareunia and vaginal bleeding. Negative for dysuria, frequency, genital sores, hematuria, pelvic pain, urgency, vaginal discharge and vaginal pain.   Musculoskeletal: Negative for arthralgias, gait problem and joint swelling.   Neurological: Negative for dizziness, seizures, syncope, weakness, light-headedness, numbness and headaches.   Hematological: Negative for adenopathy.   Psychiatric/Behavioral: Negative.        Physical Exam  Vital Signs: /82   Pulse 76   Temp 98.7 °F (37.1 °C) (Oral)   Resp 16   Wt 69.9 kg (154 lb)   SpO2 99%   BMI 23.76 kg/m²   Pain Score    07/03/19 1145   PainSc: 0-No pain      General Appearance:  alert, cooperative, no apparent distress, appears stated age and normal weight   Neurologic/Psychiatric: A&O x 3, gait steady, appropriate affect   HEENT:  Normocephalic, without obvious abnormality, mucous membranes moist   Neck: Supple, symmetrical, trachea midline, no adenopathy;  No thyromegaly, masses, or tenderness   Back:   Symmetric, no curvature, ROM normal, no CVA tenderness   Lungs:   Clear to auscultation bilaterally; respirations regular, even, and unlabored bilaterally   Heart:  Regular rate and rhythm, no murmurs appreciated   Breasts:  deferred   Abdomen:   Soft, non-tender, non-distended and no organomegaly   Lymph nodes: No cervical, supraclavicular, inguinal or axillary adenopathy noted   Extremities: Normal, atraumatic; no clubbing, cyanosis, or edema    Pelvic: External Genitalia  without lesions or skin changes  Vagina  is pink, without obvious lesions, mild radiation change noted. Scant pooling of blood noted upon insertion of speculum  Cervix  Unable to discern cervix from vaginal apex  consistent with history of radiation change. Visibility limited due to friable tissues upon speculum exam and visible bruising to cervix/apex. No palpable masses, tissues soft. Tenderness noted throughout. Pap smear obtained.   Uterus  normal size, midposition and nontender  Ovaries  without palpable masses or fullness  Parametria  smooth  Rectovaginal  Female rectovaginal: deferred     ECOG Performance Status: 0 - Asymptomatic    Procedure Note:  No notes on file      Assessment and Plan:  Jackelyn was seen today for follow-up.    Diagnoses and all orders for this visit:    Cancer of overlapping sites of cervix uteri (CMS/HCC)  -     NM Pet Skull Base To Mid Thigh; Future  -     Liquid-based Pap Smear, Diagnostic; Future    Vaginal bleeding  -     NM Pet Skull Base To Mid Thigh; Future  -     Liquid-based Pap Smear, Diagnostic; Future    Other orders  -     conjugated estrogens (PREMARIN) 0.625 MG/GM vaginal cream; Insert  into the vagina Daily.        Patient and I discussed her symptoms and physical exam findings. There is active bleeding and tenderness upon exam today consistent with her report of pain and bleeding with intercourse. There are no firm palpable masses or obvious lesions/tumors, however there is visible bruising and very friable tissues limiting visibility. Last pap smear negative in 2/2018, repeated today. I informed patient there is a chance today's pap will not be diagnostic due to obscuring blood, but we will send.   Discussed exam appears consistent with trauma to the vaginal apex/radiated cervix possibly from extended period of inactivity and recent IC. She has been able to have normal intercourse following radiation previously and I am hopeful for her that she may be able to have this again in the future.   At this time, it is important that we obtain imaging to rule out any evidence of disease as today's exam was limited. She v/u and agrees with plan. PET scan ordered. We will try to have  this done while she is here, if not can be done in [place]. She will be notified of all results upon their return.   For now, I will prescribe vaginal estrogen to promote healing of vaginal tissues and she is encouraged to avoid intercourse until bleeding has resolved. Vaginal dilator given in office today and instructed on use. Discussed plan to heal/strengthen vaginal tissues in hope of negative imaging.       Return to clinic in 1 month as scheduled for Annual exam and ongoing cancer surveillance.      TEETEE Fan

## 2019-07-10 DIAGNOSIS — Z85.41 HISTORY OF CERVICAL CANCER: Primary | ICD-10-CM

## 2019-07-10 DIAGNOSIS — N93.9 VAGINAL BLEEDING: ICD-10-CM

## 2019-07-12 ENCOUNTER — TELEPHONE (OUTPATIENT)
Dept: GYNECOLOGIC ONCOLOGY | Facility: CLINIC | Age: 49
End: 2019-07-12

## 2019-07-19 ENCOUNTER — TELEPHONE (OUTPATIENT)
Dept: GYNECOLOGIC ONCOLOGY | Facility: CLINIC | Age: 49
End: 2019-07-19

## 2019-07-19 NOTE — TELEPHONE ENCOUNTER
Called patient with results from CT scan. No evidence of disease. Continue Premarin cream 1/2 applicator 3x weekly at night and vaginal dilator. Patient states she has scant bleeding with dilator use, no problems. No recurrent or spontaneous heavy bleeding.

## 2020-06-09 ENCOUNTER — TRANSCRIBE ORDERS (OUTPATIENT)
Dept: ADMINISTRATIVE | Facility: HOSPITAL | Age: 50
End: 2020-06-09

## 2020-06-09 DIAGNOSIS — Z12.31 SCREENING MAMMOGRAM, ENCOUNTER FOR: Primary | ICD-10-CM

## 2020-06-17 ENCOUNTER — OFFICE VISIT (OUTPATIENT)
Dept: GYNECOLOGIC ONCOLOGY | Facility: CLINIC | Age: 50
End: 2020-06-17

## 2020-06-17 VITALS
HEART RATE: 80 BPM | WEIGHT: 145 LBS | RESPIRATION RATE: 14 BRPM | OXYGEN SATURATION: 97 % | TEMPERATURE: 97.9 F | BODY MASS INDEX: 21.98 KG/M2 | SYSTOLIC BLOOD PRESSURE: 115 MMHG | HEIGHT: 68 IN | DIASTOLIC BLOOD PRESSURE: 84 MMHG

## 2020-06-17 DIAGNOSIS — C53.8 CANCER OF OVERLAPPING SITES OF CERVIX UTERI (HCC): Primary | ICD-10-CM

## 2020-06-17 PROCEDURE — 99214 OFFICE O/P EST MOD 30 MIN: CPT | Performed by: NURSE PRACTITIONER

## 2020-06-17 NOTE — PROGRESS NOTES
GYN ONCOLOGY CANCER SURVEILLANCE FOLLOW-UP    Jackelyn Galindo  [id number]  1970    Chief Complaint: Follow-up (no complaints)        History of present illness:  Jackelyn Galindo is a 50 y.o. year old female who is here today for ongoing surveillance of Cervical Cancer, see Cancer History. Patient has had no ongoing heavy bleeding or pain with intercourse since evaluation last year. She notes occasional spotting after intercourse, but this is unchanged since completion of radiation. She continues to travel to [place] regularly for business and to visit family, wants to continue her follow-ups here despite living in [place], [place]. Since last visit she developed pelvic discomfort and was evaluated by a local gyn oncologist. She underwent hysteroscopy for what sounds like hematometra in late 2019. She has had no problems since that time. She is feeling generally well today and denies any problems or concerns. She denies vaginal bleeding, pelvic pain, concerning lesions, or changes in bowel or bladder function. She is due to repeat pap smear today.           Cancer History:      Cancer of overlapping sites of cervix uteri (CMS/[facility])    11/3/2014 Biopsy     Cervical biopsy and EMB for pap smear showing high-grade lesion with 6 months of AUB including post-coital bleeding. Endometrium was benign, however, cervical biopsy showed marked atypia consistent with at least CIS and suspicious for invasion.       11/6/2014 -  Other Event     6 cm verrucoid lesion identified upon exam and consistent with invasive cancer. Tumor replaced most of the cervix and did not involve the vagina.       11/10/2014 Imaging     PET scan consistent with primary malignancy at the cervix. Also reveals fluid in the endometrial cavity and low level activity at the right adnexa      11/19/2014 - 12/22/2014 Chemotherapy     Cisplatin 40 mg/m2 weekly x 6 cycles +RT      11/20/2014 - 12/31/2014 Radiation     Pelvis received 45 Gy in 25 fractions with 6 MV  "photons utilizing IMRT treatment planning.      12/15/2014 Surgery     Placement of cervical sleeve with tandem and ovoid placement per Dr. Ramsay      12/15/2014 - 1/8/2015 Radiation     Brachytherapy implants x 5 with 5.5 Gy delivered to point \"A\" on each implant        2/16/2016 Imaging     Repeat PET shows resolution of activity at the right adnexa and cervical regions. New activity identified within a posterior nodule in the right upper lung, concerning for malignant focus, but needing clinical correlation      3/11/2016 -  Other Event     Navigational brochoscopy for evaluation of RUL lesion, all cytology negative      5/13/2016 Imaging     Repeat chest CT shows stable 1 cm soft tissue nodule      7/19/2019 Imaging     CT abdomen pelvis due new pain, bleeding. Imaging negative for disease         Past Medical History:   Diagnosis Date   • Cervical cancer (CMS/HCC)    • Drug therapy 10/2014    FOR CERVICAL CA   • Hx of radiation therapy 10/2014    FOR CERVICAL CA   • Lung nodule, solitary     1 cm in RUL, negative cytology upon navigational brochoscopy   • Malignant tumor of cervix (CMS/HCC)        Past Surgical History:   Procedure Laterality Date   • COLONOSCOPY     • TANDEM AND OVOID INSERTION      with cervical sleeve, sleeve then removed    • TONSILLECTOMY  11/2016       MEDICATIONS: The current medication list was reviewed and reconciled.     Allergies:  has No Known Allergies.    Family History   Problem Relation Age of Onset   • Colon cancer Mother    • Ovarian cancer Mother 35   • Breast cancer Maternal Aunt    • Coronary artery disease Father    • Breast cancer Other    • Breast cancer Paternal Aunt 50       Last imaging study was CT abdomen pelvis in [place] 7/17/2019.     Review of Systems   Constitutional: Negative for appetite change, chills, fatigue, fever and unexpected weight change.   Respiratory: Negative for cough, shortness of breath and wheezing.    Cardiovascular: Negative for chest pain, " "palpitations and leg swelling.   Gastrointestinal: Negative for abdominal distention, abdominal pain, blood in stool, constipation, diarrhea, nausea and vomiting.   Endocrine: Negative.    Genitourinary: Negative for dyspareunia, dysuria, frequency, genital sores, hematuria, pelvic pain, urgency, vaginal bleeding, vaginal discharge and vaginal pain.   Musculoskeletal: Negative for arthralgias, gait problem and joint swelling.   Neurological: Negative for dizziness, seizures, syncope, weakness, light-headedness, numbness and headaches.   Hematological: Negative for adenopathy.   Psychiatric/Behavioral: Negative.        PHQ-9 Total Score: 0      Physical Exam  Vital Signs: /84   Pulse 80   Temp 97.9 °F (36.6 °C)   Resp 14   Ht 171.5 cm (67.5\")   Wt 65.8 kg (145 lb)   SpO2 97%   BMI 22.38 kg/m²   Vitals:    06/17/20 1425   PainSc: 0-No pain           General Appearance:  alert, cooperative, no apparent distress, appears stated age and normal weight   Neurologic/Psychiatric: A&O x 3, gait steady, appropriate affect   HEENT:  Normocephalic, without obvious abnormality, mucous membranes moist   Neck: Supple, symmetrical, trachea midline, no adenopathy;  No thyromegaly, masses, or tenderness   Back:   Symmetric, no curvature, ROM normal, no CVA tenderness   Lungs:   Clear to auscultation bilaterally; respirations regular, even, and unlabored bilaterally   Heart:  Regular rate and rhythm, no murmurs appreciated   Breasts:  deferred   Abdomen:   Soft, non-tender, non-distended and no organomegaly   Lymph nodes: No cervical, supraclavicular, inguinal adenopathy noted   Extremities: Normal, atraumatic; no clubbing, cyanosis, or edema    Pelvic: External Genitalia  without lesions or skin changes  Vagina  is pink, moist, without lesions.  and changes consistent with previous radiation.  Cervix  without lesions, no discharge, no CMT and decreased cervical mobility and stenosis consistent with prior radiation. Pap " smear obtained  Uterus  normal size, midposition, nontender and fixed  Ovaries  without palpable masses or fullness  Parametria  smooth  Rectovaginal  Female rectovaginal: deferred     ECOG Performance Status: (0) Fully Active - Able to Carry On All Pre-disease Performance Without Restriction    Procedure Note:  No notes on file      Assessment and Plan:  Jackelyn was seen today for follow-up.    Diagnoses and all orders for this visit:    Cancer of overlapping sites of cervix uteri (CMS/HCC)  -     Pap IG, Rfx HPV ASCU; Future          There is no evidence of disease upon today's exam. She is over 5 years out from completion of treatment and bleeding/dyspareunia last year has since resolved. She may go to yearly visits. She is understanding to call with any changes in pelvic symptoms or general GYN concerns at any time between regularly scheduled visits.   Pap was done today. If she does not receive the results of the Pap within 2 weeks  time, she was instructed to call to find out the results.        Pain assessment was performed today as a part of patient’s care.  For patients with pain related to surgery, gynecologic malignancy or cancer treatment, the plan is as noted in the assessment/plan.  For patients with pain not related to these issues, they are to seek any further needed care from a more appropriate provider, such as PCP.      Return to clinic in 1 year for Annual exam.      TEETEE Fan

## 2020-07-15 ENCOUNTER — APPOINTMENT (OUTPATIENT)
Dept: MAMMOGRAPHY | Facility: HOSPITAL | Age: 50
End: 2020-07-15

## 2020-07-28 ENCOUNTER — TELEPHONE (OUTPATIENT)
Dept: GYNECOLOGIC ONCOLOGY | Facility: CLINIC | Age: 50
End: 2020-07-28

## 2020-07-28 NOTE — TELEPHONE ENCOUNTER
PT CALLED AT 4:35, OFFICE WAS CLOSED, SHE NEEDS A PRESCRIPTION FAXED OVER TO [facility] IN [place].  FOR HER MAMMOGRAM, FAX:  [phone]  PT [phone]

## 2020-07-29 DIAGNOSIS — Z12.31 ENCOUNTER FOR SCREENING MAMMOGRAM FOR BREAST CANCER: Primary | ICD-10-CM

## 2021-06-21 NOTE — PROGRESS NOTES
"GYN ONCOLOGY CANCER SURVEILLANCE FOLLOW-UP    Jackelyn Galindo  [id number]  1970    Chief Complaint: Annual Exam (Cervical Cancer Surveillance Since 2014)        History of present illness:  Jackelyn Galindo is a 51 y.o. female who is here today for ongoing surveillance of Cervical Cancer, see Cancer History.  She continues have occasional spotting with intercourse, but this is unchanged. She continues to travel to [place] regularly for business and to visit family, wants to continue her follow-ups here despite living in [place], [place]. She is feeling generally well today and denies any problems or concerns. She denies vaginal bleeding, pelvic pain, concerning lesions, or changes in bowel or bladder function. She is due to repeat pap smear today.           Cancer History:   Oncology/Hematology History   Cancer of overlapping sites of cervix uteri (CMS/HCC)   11/3/2014 Biopsy    Cervical biopsy and EMB for pap smear showing high-grade lesion with 6 months of AUB including post-coital bleeding. Endometrium was benign, however, cervical biopsy showed marked atypia consistent with at least CIS and suspicious for invasion.      11/6/2014 -  Other Event    6 cm verrucoid lesion identified upon exam and consistent with invasive cancer. Tumor replaced most of the cervix and did not involve the vagina.      11/10/2014 Imaging    PET scan consistent with primary malignancy at the cervix. Also reveals fluid in the endometrial cavity and low level activity at the right adnexa     11/19/2014 - 12/22/2014 Chemotherapy    Cisplatin 40 mg/m2 weekly x 6 cycles +RT     11/20/2014 - 12/31/2014 Radiation    Pelvis received 45 Gy in 25 fractions with 6 MV photons utilizing IMRT treatment planning.     12/15/2014 Surgery    Placement of cervical sleeve with tandem and ovoid placement per Dr. Ramsay     12/15/2014 - 1/8/2015 Radiation    Brachytherapy implants x 5 with 5.5 Gy delivered to point \"A\" on each implant       2/16/2016 " Imaging    Repeat PET shows resolution of activity at the right adnexa and cervical regions. New activity identified within a posterior nodule in the right upper lung, concerning for malignant focus, but needing clinical correlation     3/11/2016 -  Other Event    Navigational brochoscopy for evaluation of RUL lesion, all cytology negative     5/13/2016 Imaging    Repeat chest CT shows stable 1 cm soft tissue nodule     7/19/2019 Imaging    CT abdomen pelvis due new pain, bleeding. Imaging negative for disease         Past Medical History:   Diagnosis Date   • Cervical cancer (CMS/HCC)    • Drug therapy 10/2014    FOR CERVICAL CA   • Hx of radiation therapy 10/2014    FOR CERVICAL CA   • Lung nodule, solitary     1 cm in RUL, negative cytology upon navigational brochoscopy   • Malignant tumor of cervix (CMS/HCC)        Past Surgical History:   Procedure Laterality Date   • COLONOSCOPY     • TANDEM AND OVOID INSERTION      with cervical sleeve, sleeve then removed    • TONSILLECTOMY  11/2016       MEDICATIONS: The current medication list was reviewed and reconciled.     Allergies:  has No Known Allergies.    Family History   Problem Relation Age of Onset   • Colon cancer Mother    • Ovarian cancer Mother 35   • Breast cancer Maternal Aunt    • Coronary artery disease Father    • Breast cancer Other    • Breast cancer Paternal Aunt 50       Last imaging study was CT abdomen pelvis in [place] 7/17/2019.     Review of Systems   Constitutional: Negative for appetite change, chills, fatigue, fever and unexpected weight change.   Respiratory: Negative for cough, shortness of breath and wheezing.    Cardiovascular: Negative for chest pain, palpitations and leg swelling.   Gastrointestinal: Negative for abdominal distention, abdominal pain, blood in stool, constipation, diarrhea, nausea and vomiting.   Endocrine: Negative.    Genitourinary: Negative for dyspareunia, dysuria, frequency, genital sores, hematuria, pelvic pain,  "urgency, vaginal bleeding, vaginal discharge and vaginal pain.   Musculoskeletal: Negative for arthralgias, gait problem and joint swelling.   Neurological: Negative for dizziness, seizures, syncope, weakness, light-headedness, numbness and headaches.   Hematological: Negative for adenopathy.   Psychiatric/Behavioral: Negative.        PHQ-9 Total Score: 0      Physical Exam  Vital Signs: /88 Comment: LUE  Pulse 70   Temp 98 °F (36.7 °C) (Infrared)   Resp 16   Ht 170.2 cm (67\") Comment: per pt  Wt 68.9 kg (152 lb)   BMI 23.81 kg/m²   Vitals:    06/22/21 1411   PainSc: 0-No pain           General Appearance:  alert, cooperative, no apparent distress, appears stated age and normal weight   Neurologic/Psychiatric: A&O x 3, gait steady, appropriate affect   HEENT:  Normocephalic, without obvious abnormality, mucous membranes moist   Neck: Supple, symmetrical, trachea midline, no adenopathy;  No thyromegaly, masses, or tenderness   Back:   Symmetric, no curvature, ROM normal, no CVA tenderness   Lungs:   Clear to auscultation bilaterally; respirations regular, even, and unlabored bilaterally   Heart:  Regular rate and rhythm, no murmurs appreciated   Breasts:  deferred   Abdomen:   Soft, non-tender, non-distended and no organomegaly   Lymph nodes: No cervical, supraclavicular, inguinal adenopathy noted   Extremities: Normal, atraumatic; no clubbing, cyanosis, or edema    Pelvic: External Genitalia  without lesions or skin changes  Vagina  is pink, moist, without lesions.  and changes consistent with previous radiation.  Cervix  without lesions, no discharge, no CMT and decreased cervical mobility and stenosis consistent with prior radiation. Pap smear obtained  Uterus  normal size, midposition, nontender and fixed  Ovaries  without palpable masses or fullness  Parametria  smooth  Rectovaginal  Female rectovaginal: deferred     ECOG Performance Status: (0) Fully Active - Able to Carry On All Pre-disease " Performance Without Restriction    Procedure Note:  No notes on file      Assessment and Plan:  Jackelyn was seen today for follow-up.  She is doing well with no evidence of disease on exam today.    Cancer of overlapping sites of cervix uteri (CMS/HCC)  -     Pap smear performed today    There is no evidence of disease upon today's exam. She is over 5 years out from completion of treatment and bleeding/dyspareunia last year has since resolved. She may go to yearly visits. She is understanding to call with any changes in pelvic symptoms or general GYN concerns at any time between regularly scheduled visits.     Pap was done today. If she does not receive the results of the Pap within 2 weeks  time, she was instructed to call to find out the results.      Pain assessment was performed today as a part of patient’s care.  For patients with pain related to surgery, gynecologic malignancy or cancer treatment, the plan is as noted in the assessment/plan.  For patients with pain not related to these issues, they are to seek any further needed care from a more appropriate provider, such as PCP.    Return to clinic in 1 year for Annual exam.  She would like to continue to follow here.    Patient was seen and examined with Dr. Cross,  resident, who performed portions of the examination and documentation for this patient's care under my direct supervision.  I agree with the above documentation and plan.    Kaye Miranda MD  06/22/21  16:40 EDT

## 2021-06-22 ENCOUNTER — OFFICE VISIT (OUTPATIENT)
Dept: GYNECOLOGIC ONCOLOGY | Facility: CLINIC | Age: 51
End: 2021-06-22

## 2021-06-22 VITALS
SYSTOLIC BLOOD PRESSURE: 132 MMHG | DIASTOLIC BLOOD PRESSURE: 88 MMHG | RESPIRATION RATE: 16 BRPM | BODY MASS INDEX: 23.86 KG/M2 | HEART RATE: 70 BPM | WEIGHT: 152 LBS | HEIGHT: 67 IN | TEMPERATURE: 98 F

## 2021-06-22 DIAGNOSIS — Z85.41 HISTORY OF CERVICAL CANCER: ICD-10-CM

## 2021-06-22 DIAGNOSIS — C53.8 CANCER OF OVERLAPPING SITES OF CERVIX UTERI (HCC): Primary | ICD-10-CM

## 2021-06-22 PROCEDURE — 99213 OFFICE O/P EST LOW 20 MIN: CPT | Performed by: OBSTETRICS & GYNECOLOGY

## 2022-07-20 ENCOUNTER — OFFICE VISIT (OUTPATIENT)
Dept: GYNECOLOGIC ONCOLOGY | Facility: CLINIC | Age: 52
End: 2022-07-20

## 2022-07-20 VITALS
WEIGHT: 149.5 LBS | SYSTOLIC BLOOD PRESSURE: 133 MMHG | HEART RATE: 80 BPM | RESPIRATION RATE: 16 BRPM | BODY MASS INDEX: 23.42 KG/M2 | OXYGEN SATURATION: 96 % | TEMPERATURE: 98 F | DIASTOLIC BLOOD PRESSURE: 81 MMHG

## 2022-07-20 DIAGNOSIS — Z85.41 HISTORY OF CERVICAL CANCER: ICD-10-CM

## 2022-07-20 DIAGNOSIS — Z01.419 WELL WOMAN EXAM WITH ROUTINE GYNECOLOGICAL EXAM: Primary | ICD-10-CM

## 2022-07-20 PROCEDURE — 99396 PREV VISIT EST AGE 40-64: CPT | Performed by: NURSE PRACTITIONER

## 2022-07-20 PROCEDURE — 88142 CYTOPATH C/V THIN LAYER: CPT | Performed by: NURSE PRACTITIONER

## 2022-07-20 NOTE — PROGRESS NOTES
GYN ONCOLOGY ANNUAL WELL WOMAN VISIT      Jackelyn Galindo  [id number]  1970      Subjective   Chief Complaint: Annual Exam and Cervical Cancer        History of present illness:      Jackelyn Galindo is a 52 y.o. year old female who is here today for an annual exam. She has a history of cervical cancer as outlined below. She continues have occasional spotting with intercourse, but this is unchanged. She continues to travel to [place] regularly for business and to visit family, wants to continue her follow-ups here despite living in [place], [place]. She recent returned from a month in [place] visiting her daughter who is studying there this summer. Her oldest son has taken a job here in [place] and her youngest is starting her senior year. She reports she is feeling very well today and has no complaints. She denies vaginal bleeding, pelvic pain, changes in bowel or bladder function, new or concerning lesions, and breast problems. In between visits here she was seen by a local GYN for evaluation of increased vaginal discharge, workup and ultrasound negative. Symptoms absent presently. She continues her Premarin vaginal cream, now gets prescriptions locally.       Cancer History:   Oncology/Hematology History   Cancer of overlapping sites of cervix uteri (HCC)   11/3/2014 Biopsy    Cervical biopsy and EMB for pap smear showing high-grade lesion with 6 months of AUB including post-coital bleeding. Endometrium was benign, however, cervical biopsy showed marked atypia consistent with at least CIS and suspicious for invasion.      11/6/2014 -  Other Event    6 cm verrucoid lesion identified upon exam and consistent with invasive cancer. Tumor replaced most of the cervix and did not involve the vagina.      11/10/2014 Imaging    PET scan consistent with primary malignancy at the cervix. Also reveals fluid in the endometrial cavity and low level activity at the right adnexa     11/19/2014 - 12/22/2014 Chemotherapy     "Cisplatin 40 mg/m2 weekly x 6 cycles +RT     2014 - 2014 Radiation    Pelvis received 45 Gy in 25 fractions with 6 MV photons utilizing IMRT treatment planning.     12/15/2014 Surgery    Placement of cervical sleeve with tandem and ovoid placement per Dr. Ramsay     12/15/2014 - 2015 Radiation    Brachytherapy implants x 5 with 5.5 Gy delivered to point \"A\" on each implant       2016 Imaging    Repeat PET shows resolution of activity at the right adnexa and cervical regions. New activity identified within a posterior nodule in the right upper lung, concerning for malignant focus, but needing clinical correlation     3/11/2016 -  Other Event    Navigational brochoscopy for evaluation of RUL lesion, all cytology negative     2016 Imaging    Repeat chest CT shows stable 1 cm soft tissue nodule     2019 Imaging    CT abdomen pelvis due new pain, bleeding. Imaging negative for disease         Obstetric History:  OB History        3    Para   3    Term   3            AB        Living           SAB        IAB        Ectopic        Molar        Multiple        Live Births                   Menstrual History:     No LMP recorded. (Menstrual status: Chemotherapy/radiation).          The current medication list and allergy list were reviewed and reconciled.     Past Medical History, Past Surgical History, Social History, Family History have been reviewed and are without significant changes except as mentioned.    Health Maintenance:  Last mammogram was  in [place]. Last pap smear was 2021, results were  normal PAP..        Review of Systems   Constitutional: Negative.    Gastrointestinal: Negative.    Genitourinary: Negative.        Objective   Physical Exam  Vital Signs: /81   Pulse 80   Temp 98 °F (36.7 °C)   Resp 16   Wt 67.8 kg (149 lb 8 oz)   SpO2 96%   BMI 23.42 kg/m²   Vitals:    22 1040   PainSc: 0-No pain           General Appearance:  alert, " cooperative, no apparent distress, appears stated age and normal weight   Neurologic/Psychiatric: A&O x 3, gait steady, appropriate affect   Lungs:   Clear to auscultation bilaterally; respirations regular, even, and unlabored bilaterally   Heart:  Regular rate and rhythm, no murmurs appreciated   Breasts:  Symmetrical, no masses, no lesions, no nipple discharge and implants noted bilaterally   Abdomen:   Soft, non-tender, non-distended and no organomegaly   Lymph nodes: No cervical, supraclavicular, inguinal or axillary adenopathy noted   Extremities: Normal, atraumatic; no clubbing, cyanosis, or edema    Skin: No rashes, ulcers, or suspicious lesions noted   Pelvic: External Genitalia  without lesions or skin changes  Vagina  is pink, moist, without lesions.  and changes consistent with previous radiation.  Cervix  decreased cervical mobility and stenosis consistent with prior radiation. No lesions, masses, discharge, or CMT noted. Pap smear obtained  Uterus  normal size, midposition, nontender and fixed  Ovaries  without palpable masses or fullness  Parametria  smooth  Rectovaginal  Female rectovaginal: deferred     ECOG score: 0             PHQ-9 Total Score: 0    Procedure Note:          Assessment and Plan:      Diagnoses and all orders for this visit:    1. Well woman exam with routine gynecological exam (Primary)    2. History of cervical cancer  -     LIQUID-BASED PAP SMEAR, [facility] ([facility]); Future  -     LIQUID-BASED PAP SMEAR, [facility] ([facility])           There is no evidence of disease upon today's exam. She is understanding to call with any changes in pelvic symptoms or general GYN concerns at any time between regularly scheduled visits.     Pap was done today. If she does not receive the results of the Pap within 2 weeks  time, she was instructed to call to find out the results.      She was encouraged to get yearly mammograms.  She should report any palpable breast lump(s) or skin changes  regardless of mammographic findings.  I explained to Jackelyn that notification regarding her mammogram results will come from the center performing the study.  Our office will not be routinely calling with mammogram results.  It is her responsibility to make sure that the results from the mammogram are communicated to her by the breast center.  If she has any questions about the results, she is welcome to call our office anytime.    Colonoscopy was recommended for screening for colon cancer.  The procedure was briefly discussed and its benefits for early detection of colon cancer were emphasized.  I explained to Jackelyn that we could help her to schedule it if she wishes.  Additionally, she could also contact her primary care physician to help make this arrangement.  Alternatively, she could consider Cologuard (which would need to be done every 3 years).  If Cologuard was abnormal, colonoscopy will be required in follow-up.  In addition to being diagnostic, colonoscopy has the potential to be pre-emptive.  If a precancerous polyp were seen and removed, the chance of that polyp becoming cancerous is essentially eliminated.  Cologuard will not find the precancerous polyps as well as it does colon cancer.  Finally, Cologuard does not provide the opportunity to find and remove the precancerous polyps as readily as does colonoscopy.  After considering these options she will contact her PCP to arrange colonoscopy at home.    She was recommended to begin bone density scans (DEXA) at age 60-65 for osteoporosis screening.    Recommended daily probiotic Florajen to maintain vaginal pH balance and avoid increased discharge. Continue Premarin vaginal cream as prescribed.       Pain assessment was performed today as a part of patient’s care. For patients with pain related to surgery, gynecologic malignancy or cancer treatment, the plan is as noted in the assessment/plan.  For patients with pain not related to these issues, they  are to seek any further needed care from a more appropriate provider, such as PCP.      Follow-up:     Return to clinic in 1 year for Annual exam.      Electronically signed by TEETEE Fan on 07/20/22 at 11:08 EDT

## 2022-07-25 LAB — REF LAB TEST METHOD: NORMAL

## 2022-12-08 ENCOUNTER — TELEPHONE (OUTPATIENT)
Dept: GYNECOLOGIC ONCOLOGY | Facility: CLINIC | Age: 52
End: 2022-12-08

## 2022-12-08 NOTE — TELEPHONE ENCOUNTER
Patient called stating that she got a pap smear done that came back with abnormal cells. I gave patient my e-mail address to send a copy of the report to so Alee can look over it.

## 2022-12-09 ENCOUNTER — TELEPHONE (OUTPATIENT)
Dept: GYNECOLOGIC ONCOLOGY | Facility: CLINIC | Age: 52
End: 2022-12-09

## 2022-12-09 NOTE — TELEPHONE ENCOUNTER
Called patient. I have received and reviewed her recent pap smear from her GYN in [place]. Pap smear returned negative for STIs, cytology ASCUS with high risk HPV+. Local GYN has recommended colposcopy with biopsies. I explained to patient that given her clinical history of cervical cancer and radiation, our recommendation would be to simply repeat pap smear in 6 months. She may undergo procedure locally if she desires, but due to her history this may not be very beneficial for her. Reviewed signs and symptoms of cancer recurrence and she denies any concerning changes. She is feeling generally well at this time. Patient scheduled for next visit here in 7/2023, she will keep appointment and we will plan for repeat pap smear at that time.

## 2023-07-24 ENCOUNTER — TELEPHONE (OUTPATIENT)
Dept: GYNECOLOGIC ONCOLOGY | Facility: CLINIC | Age: 53
End: 2023-07-24
Payer: COMMERCIAL

## 2023-07-24 NOTE — TELEPHONE ENCOUNTER
----- Message from TEETEE Fan sent at 7/24/2023  1:44 PM EDT -----  Please notify patient pap smear negative :)

## 2023-07-24 NOTE — TELEPHONE ENCOUNTER
Called to give PT negative PAP results per TEETEE Britton    PT verbalized an understanding and voiced appreciation

## 2024-07-22 ENCOUNTER — OFFICE VISIT (OUTPATIENT)
Dept: GYNECOLOGIC ONCOLOGY | Facility: CLINIC | Age: 54
End: 2024-07-22
Payer: COMMERCIAL

## 2024-07-22 VITALS
HEIGHT: 66 IN | RESPIRATION RATE: 17 BRPM | OXYGEN SATURATION: 95 % | DIASTOLIC BLOOD PRESSURE: 86 MMHG | BODY MASS INDEX: 24.88 KG/M2 | WEIGHT: 154.8 LBS | SYSTOLIC BLOOD PRESSURE: 138 MMHG | TEMPERATURE: 97.5 F | HEART RATE: 94 BPM

## 2024-07-22 DIAGNOSIS — Z01.419 ENCOUNTER FOR WELL WOMAN EXAM WITH ROUTINE GYNECOLOGICAL EXAM: Primary | ICD-10-CM

## 2024-07-22 DIAGNOSIS — Z85.41 HISTORY OF CERVICAL CANCER: ICD-10-CM

## 2024-07-22 PROCEDURE — 99396 PREV VISIT EST AGE 40-64: CPT | Performed by: NURSE PRACTITIONER

## 2024-07-22 RX ORDER — CONJUGATED ESTROGENS 0.62 MG/G
CREAM VAGINAL 3 TIMES WEEKLY
Qty: 30 G | Refills: 11 | Status: SHIPPED | OUTPATIENT
Start: 2024-07-22

## 2024-07-22 NOTE — PROGRESS NOTES
GYN ONCOLOGY ANNUAL WELL WOMAN VISIT      Jackelyn Galindo  [id number]  1970      Subjective   Chief Complaint: Annual Exam       History of present illness:    Jackelyn Galindo is a 54 y.o. year old female who is here today for an annual exam.  She has a history of cervical cancer as outlined below. She is not current sexually active. She continues to travel to [place] regularly for business and to visit family, wants to continue her follow-ups here despite living in [place], [place]. One son lives here in [place]. Her daughter who also lives in [place] has traveled here with her to [place] this trip. She reports she is feeling very well today and has no complaints. She denies vaginal bleeding, pelvic pain, changes in bowel or bladder function, new or concerning lesions, and breast problems. Her local gynecologist performed a pap smear in 12/2022 that returned ASCUS with HPV+. Most recent pap completed at last visit here 7/2023 was negative. Per our discussed plan we will repeat pap smear today. She continues her Premarin vaginal cream sparingly, considering increasing use to help manage vaginal dryness. Retoshia RF, printed.  She plans to update her mammogram and first colon screening soon in [place].      Cancer History:   Oncology/Hematology History   Cancer of overlapping sites of cervix uteri   11/3/2014 Biopsy    Cervical biopsy and EMB for pap smear showing high-grade lesion with 6 months of AUB including post-coital bleeding. Endometrium was benign, however, cervical biopsy showed marked atypia consistent with at least CIS and suspicious for invasion.      11/6/2014 -  Other Event    6 cm verrucoid lesion identified upon exam and consistent with invasive cancer. Tumor replaced most of the cervix and did not involve the vagina.      11/10/2014 Imaging    PET scan consistent with primary malignancy at the cervix. Also reveals fluid in the endometrial cavity and low level activity at the right adnexa     11/19/2014 -  "2014 Chemotherapy    Cisplatin 40 mg/m2 weekly x 6 cycles +RT     2014 - 2014 Radiation    Pelvis received 45 Gy in 25 fractions with 6 MV photons utilizing IMRT treatment planning.     12/15/2014 Surgery    Placement of cervical sleeve with tandem and ovoid placement per Dr. Ramsay     12/15/2014 - 2015 Radiation    Brachytherapy implants x 5 with 5.5 Gy delivered to point \"A\" on each implant       2016 Imaging    Repeat PET shows resolution of activity at the right adnexa and cervical regions. New activity identified within a posterior nodule in the right upper lung, concerning for malignant focus, but needing clinical correlation     3/11/2016 -  Other Event    Navigational brochoscopy for evaluation of RUL lesion, all cytology negative     2016 Imaging    Repeat chest CT shows stable 1 cm soft tissue nodule     2019 Imaging    CT abdomen pelvis due new pain, bleeding. Imaging negative for disease         Obstetric History:  OB History          3    Para   3    Term   3            AB        Living             SAB        IAB        Ectopic        Molar        Multiple        Live Births                   Menstrual History:     No LMP recorded. (Menstrual status: Chemotherapy/radiation).          The current medication list and allergy list were reviewed and reconciled.     Past Medical History, Past Surgical History, Social History, Family History have been reviewed and are without significant changes except as mentioned.    Health Maintenance:  see EMR.  -last mm in [place]  -last pap 23: negative   -last colonoscopy: never       Review of Systems   Constitutional: Negative.    Respiratory: Negative.     Cardiovascular: Negative.    Gastrointestinal: Negative.    Genitourinary: Negative.    Psychiatric/Behavioral: Negative.           Objective   Physical Exam  Vital Signs: /86   Pulse 94   Temp 97.5 °F (36.4 °C) (Temporal)   Resp 17   Ht 167.6 cm " "(65.98\")   Wt 70.2 kg (154 lb 12.8 oz)   SpO2 95%   BMI 25.00 kg/m²   Vitals:    07/22/24 0956   PainSc: 0-No pain           General Appearance:  alert, cooperative, no apparent distress, appears stated age, and normal weight   Neurologic/Psych: A&O x 3, gait steady, appropriate affect   Lungs:   Clear to auscultation bilaterally; respirations regular, even, and unlabored bilaterally   Heart:  Regular rate and rhythm, no murmurs appreciated   Breasts:  Symmetrical, no masses, no lesions, no nipple discharge, and implants noted bilaterally   Abdomen:   Soft, non-tender, non-distended, and no organomegaly   Lymph nodes: No cervical, supraclavicular, inguinal or axillary adenopathy noted   Extremities: Normal, atraumatic; no clubbing, cyanosis, or edema    Skin: No rashes, ulcers, or suspicious lesions noted   Pelvic: External Genitalia  without lesions or skin changes  Vagina  is pink, moist, without lesions.  and changes consistent with previous radiation.  Cervix  decreased cervical mobility and stenosis consistent with prior radiation.  No lesions, masses, discharge, or CMT noted.  Pap smear obtained.  Uterus  normal size, midposition, nontender, and fixed  Ovaries  without palpable masses or fullness  Parametria  smooth  Rectovaginal  Female rectovaginal: deferred  deferred     ECOG score: 0             Result Review :       Tumor marker:  No results found for: \"\"    PHQ-9 Total Score: 0    Procedure Note:          Assessment and Plan:        Diagnoses and all orders for this visit:    1. Encounter for well woman exam with routine gynecological exam (Primary)    2. History of cervical cancer    Other orders  -     Estrogens Conjugated (Premarin) 0.625 MG/GM vaginal cream; Insert  into the vagina 3 (Three) Times a Week.  Dispense: 30 g; Refill: 11      There is no evidence of disease upon today's exam. She is understanding to call with any changes in pelvic symptoms or general GYN concerns at any time " between regularly scheduled visits.      Pap was done today. If she does not receive the results of the Pap within 2 weeks  time, she was instructed to call to find out the results.       She was encouraged to get yearly mammograms.  She should report any palpable breast lump(s) or skin changes regardless of mammographic findings.  I explained to Jackelyn that notification regarding her mammogram results will come from the center performing the study.  Our office will not be routinely calling with mammogram results.  It is her responsibility to make sure that the results from the mammogram are communicated to her by the breast center.  If she has any questions about the results, she is welcome to call our office anytime.     Contact local PCP in [place] for colonoscopy referral order.      She was recommended to begin bone density scans (DEXA) at age 60-65 for osteoporosis screening.     Continue Premarin vaginal cream as prescribed.           Pain assessment was performed today as a part of patient’s care. For patients with pain related to surgery, gynecologic malignancy or cancer treatment, the plan is as noted in the assessment/plan.  For patients with pain not related to these issues, they are to seek any further needed care from a more appropriate provider, such as PCP.           Follow-up:          Return to clinic in 1 year for Annual exam.      Electronically signed by TEETEE Stauffer on 07/22/2024

## 2024-07-29 LAB — REF LAB TEST METHOD: NORMAL

## 2024-08-01 ENCOUNTER — TELEPHONE (OUTPATIENT)
Dept: GYNECOLOGIC ONCOLOGY | Facility: CLINIC | Age: 54
End: 2024-08-01
Payer: COMMERCIAL

## 2024-08-01 NOTE — TELEPHONE ENCOUNTER
Patient notified of Providers comments for pap results with verbalized understanding and thank you.

## 2024-08-01 NOTE — TELEPHONE ENCOUNTER
----- Message from Jade Gerber sent at 7/31/2024 10:43 PM EDT -----  Please call and let her know pap smear showed normal results.

## 2025-05-01 RX ORDER — CONJUGATED ESTROGENS 0.62 MG/G
CREAM VAGINAL 3 TIMES WEEKLY
Qty: 30 G | Refills: 1 | Status: SHIPPED | OUTPATIENT
Start: 2025-05-02

## 2025-05-01 NOTE — TELEPHONE ENCOUNTER
Caller: Jackelyn Galindo    Relationship: Self    Best call back number: [phone]    Requested Prescriptions:   Requested Prescriptions     Pending Prescriptions Disp Refills    Estrogens Conjugated (Premarin) 0.625 MG/GM vaginal cream 30 g 11     Sig: Insert  into the vagina 3 (Three) Times a Week.        Pharmacy where request should be sent:  [facility]    Last office visit with prescribing clinician: 2024   Last telemedicine visit with prescribing clinician: Visit date not found   Next office visit with prescribing clinician: 2025     Additional details provided by patient: PHARMACY TOLD PATIENT MEDICATION REFILLS HAVE      Does the patient have less than a 3 day supply:  [] Yes  [x] No    Would you like a call back once the refill request has been completed: [] Yes [] No    If the office needs to give you a call back, can they leave a voicemail: [] Yes [] No    Nuzhat Tamez Rep   25 15:03 EDT

## 2025-07-28 ENCOUNTER — OFFICE VISIT (OUTPATIENT)
Dept: GYNECOLOGIC ONCOLOGY | Facility: CLINIC | Age: 55
End: 2025-07-28
Payer: COMMERCIAL

## 2025-07-28 VITALS
WEIGHT: 146.9 LBS | RESPIRATION RATE: 17 BRPM | DIASTOLIC BLOOD PRESSURE: 72 MMHG | HEART RATE: 73 BPM | SYSTOLIC BLOOD PRESSURE: 109 MMHG | BODY MASS INDEX: 23.61 KG/M2 | HEIGHT: 66 IN | OXYGEN SATURATION: 95 % | TEMPERATURE: 97.3 F

## 2025-07-28 DIAGNOSIS — Z01.419 ENCOUNTER FOR WELL WOMAN EXAM WITH ROUTINE GYNECOLOGICAL EXAM: Primary | ICD-10-CM

## 2025-07-28 DIAGNOSIS — N95.2 VAGINAL ATROPHY: ICD-10-CM

## 2025-07-28 DIAGNOSIS — Z85.41 HISTORY OF CERVICAL CANCER: ICD-10-CM

## 2025-07-28 PROCEDURE — 99396 PREV VISIT EST AGE 40-64: CPT | Performed by: NURSE PRACTITIONER

## 2025-07-28 RX ORDER — CLOBETASOL PROPIONATE 0.5 MG/G
1 OINTMENT TOPICAL 2 TIMES DAILY
Qty: 60 G | Refills: 2 | Status: SHIPPED | OUTPATIENT
Start: 2025-07-28

## 2025-07-28 RX ORDER — CONJUGATED ESTROGENS 0.62 MG/G
CREAM VAGINAL 3 TIMES WEEKLY
Qty: 30 G | Refills: 1 | Status: SHIPPED | OUTPATIENT
Start: 2025-07-28

## 2025-07-28 RX ORDER — CLOBETASOL PROPIONATE 0.5 MG/G
1 OINTMENT TOPICAL 2 TIMES DAILY
COMMUNITY
End: 2025-07-28 | Stop reason: SDUPTHER

## 2025-07-28 NOTE — PROGRESS NOTES
GYN ONCOLOGY ANNUAL WELL WOMAN VISIT      Jackelyn Galindo  [id number]  1970      Subjective   Chief Complaint: Annual Exam       History of present illness:    Jackelyn Galindo is a 55 y.o. year old female who is here today for an annual exam. She has a history of cervical cancer as outlined below. She continues to travel to [place] regularly for business and to visit family, wants to continue her follow-ups here despite living in [place], [place]. She reports she is feeling very well today and has no complaints. She denies vaginal bleeding, pelvic pain, changes in bowel or bladder function, new or concerning lesions, and breast problems. She does have local general gynecologist. Most recent pap completed at last visit here 7/2024 was negative. Per our discussed plan we will repeat pap smear today. She continues her Premarin vaginal cream sparingly, considering increasing use to help manage vaginal dryness. Needs RF if this and also clobetasol cream.      - In January of this year she celebrated 10-year anniversary since completing treatment!  - Has annual mammograms, just recently completed one.  Has these in [place]  - She had a colonoscopy many years ago, before age 43.  Normal results, no polyps or biopsies taken.  This was performed due to concern of her mother having colon cancer.  Since that time she has actually found out that her mother passed from metastatic ovarian cancer.  - Patient has completed genetic testing and this was reportedly negative for any mutations  - Pap smear completed today  - She does endorse some vaginal dryness and easily irritated.  Request refills of both medications today.  - Otherwise no concerns.  Doing very well.      Cancer History:   Oncology/Hematology History   Cancer of overlapping sites of cervix uteri   11/3/2014 Biopsy    Cervical biopsy and EMB for pap smear showing high-grade lesion with 6 months of AUB including post-coital bleeding. Endometrium was benign, however,  "cervical biopsy showed marked atypia consistent with at least CIS and suspicious for invasion.      2014 -  Other Event    6 cm verrucoid lesion identified upon exam and consistent with invasive cancer. Tumor replaced most of the cervix and did not involve the vagina.      11/10/2014 Imaging    PET scan consistent with primary malignancy at the cervix. Also reveals fluid in the endometrial cavity and low level activity at the right adnexa     2014 - 2014 Chemotherapy    Cisplatin 40 mg/m2 weekly x 6 cycles +RT     2014 - 2014 Radiation    Pelvis received 45 Gy in 25 fractions with 6 MV photons utilizing IMRT treatment planning.     12/15/2014 Surgery    Placement of cervical sleeve with tandem and ovoid placement per Dr. Ramsay     12/15/2014 - 2015 Radiation    Brachytherapy implants x 5 with 5.5 Gy delivered to point \"A\" on each implant       2016 Imaging    Repeat PET shows resolution of activity at the right adnexa and cervical regions. New activity identified within a posterior nodule in the right upper lung, concerning for malignant focus, but needing clinical correlation     3/11/2016 -  Other Event    Navigational brochoscopy for evaluation of RUL lesion, all cytology negative     2016 Imaging    Repeat chest CT shows stable 1 cm soft tissue nodule     2019 Imaging    CT abdomen pelvis due new pain, bleeding. Imaging negative for disease         Obstetric History:  OB History          3    Para   3    Term   3            AB        Living             SAB        IAB        Ectopic        Molar        Multiple        Live Births                   Menstrual History:     No LMP recorded. (Menstrual status: Chemotherapy/radiation).          The current medication list and allergy list were reviewed and reconciled.     Past Medical History, Past Surgical History, Social History, Family History have been reviewed and are without significant changes except " "as mentioned.    Health Maintenance:  see EMR.        Review of Systems      Objective   Physical Exam  Vital Signs: /72   Pulse 73   Temp 97.3 °F (36.3 °C) (Temporal)   Resp 17   Ht 167.6 cm (65.98\")   Wt 66.6 kg (146 lb 14.4 oz)   SpO2 95%   BMI 23.72 kg/m²   Vitals:    07/28/25 0834   PainSc: 0-No pain           General Appearance:  alert, cooperative, no apparent distress, appears stated age, and normal weight   Neurologic/Psych: A&O x 3, gait steady, appropriate affect   Lungs:   Clear to auscultation bilaterally; respirations regular, even, and unlabored bilaterally   Heart:  Regular rate and rhythm, no murmurs appreciated   Breasts:  Symmetrical, no masses, no lesions, no nipple discharge, and implants noted bilaterally   Abdomen:   Soft, non-tender, non-distended, and no organomegaly   Lymph nodes: No cervical, supraclavicular, inguinal or axillary adenopathy noted   Extremities: Normal, atraumatic; no clubbing, cyanosis, or edema    Skin: No rashes, ulcers, or suspicious lesions noted   Pelvic: External Genitalia  without lesions or skin changes  Vagina atrophic, without lesions. +changes consistent with previous radiation.  Cervix  decreased cervical mobility and stenosis consistent with prior radiation.  No lesions, masses, discharge, or CMT noted.  Pap smear obtained.  Uterus  normal size, midposition, nontender, and fixed  Ovaries  without palpable masses or fullness  Parametria  smooth  Rectovaginal  Female rectovaginal: deferred  deferred     ECOG score: 0                  Assessment and Plan:      -There is no evidence of disease upon today's exam. She is understanding to call with any changes in pelvic symptoms or general GYN concerns at any time between regularly scheduled visits.      -Pap was done today. If she does not receive the results of the Pap within 2 weeks  time, she was instructed to call to find out the results.     - Encouraged to continue yearly mammograms as well as " update colorectal cancer screening  per national guidelines    -She was recommended to begin bone density scans (DEXA) at age 60-65 for osteoporosis screening.     -Rx's refilled, as below     Diagnoses and all orders for this visit:    1. Encounter for well woman exam with routine gynecological exam (Primary)  -     LIQUID-BASED PAP SMEAR WITH HPV GENOTYPING REGARDLESS OF INTERPRETATION ([facility]); Future    2. History of cervical cancer  -     LIQUID-BASED PAP SMEAR WITH HPV GENOTYPING REGARDLESS OF INTERPRETATION ([facility]); Future    3. Vaginal atrophy    Other orders  -     clobetasol (TEMOVATE) 0.05 % ointment; Apply 1 Application topically to the appropriate area as directed 2 (Two) Times a Day.  Dispense: 60 g; Refill: 2  -     Estrogens Conjugated (Premarin) 0.625 MG/GM vaginal cream; Insert  into the vagina 3 (Three) Times a Week. Insert into vagina 3 (three) times a week  Dispense: 30 g; Refill: 1    Pain assessment was performed today as a part of patient’s care. For patients with pain related to surgery, gynecologic malignancy or cancer treatment, the plan is as noted in the assessment/plan.  For patients with pain not related to these issues, they are to seek any further needed care from a more appropriate provider, such as PCP.    Follow-up:     Return to clinic in 1 year for Annual exam, ongoing cancer surveillance, and repeat pap smear (top & bottom, +pap).      Electronically signed by TEETEE Stauffer on 07/28/2025

## 2025-07-31 LAB — REF LAB TEST METHOD: NORMAL
